# Patient Record
Sex: FEMALE | Race: ASIAN | Employment: OTHER | URBAN - METROPOLITAN AREA
[De-identification: names, ages, dates, MRNs, and addresses within clinical notes are randomized per-mention and may not be internally consistent; named-entity substitution may affect disease eponyms.]

---

## 2017-02-27 ENCOUNTER — TELEPHONE (OUTPATIENT)
Dept: INTERNAL MEDICINE CLINIC | Facility: CLINIC | Age: 77
End: 2017-02-27

## 2017-05-12 PROBLEM — Z85.3 HISTORY OF BREAST CANCER: Status: ACTIVE | Noted: 2017-05-12

## 2017-05-15 ENCOUNTER — OFFICE VISIT (OUTPATIENT)
Dept: FAMILY MEDICINE CLINIC | Facility: CLINIC | Age: 77
End: 2017-05-15

## 2017-05-15 ENCOUNTER — APPOINTMENT (OUTPATIENT)
Dept: LAB | Age: 77
End: 2017-05-15
Attending: FAMILY MEDICINE
Payer: MEDICARE

## 2017-05-15 VITALS
HEART RATE: 59 BPM | TEMPERATURE: 98 F | BODY MASS INDEX: 26.41 KG/M2 | HEIGHT: 59 IN | DIASTOLIC BLOOD PRESSURE: 82 MMHG | WEIGHT: 131 LBS | SYSTOLIC BLOOD PRESSURE: 138 MMHG | RESPIRATION RATE: 16 BRPM

## 2017-05-15 DIAGNOSIS — E05.00 GRAVES DISEASE: ICD-10-CM

## 2017-05-15 DIAGNOSIS — Z00.00 MEDICARE ANNUAL WELLNESS VISIT, SUBSEQUENT: ICD-10-CM

## 2017-05-15 DIAGNOSIS — Z00.00 ENCOUNTER FOR ANNUAL HEALTH EXAMINATION: Primary | ICD-10-CM

## 2017-05-15 DIAGNOSIS — Z85.3 HISTORY OF BREAST CANCER: ICD-10-CM

## 2017-05-15 DIAGNOSIS — E78.2 MIXED HYPERLIPIDEMIA: ICD-10-CM

## 2017-05-15 PROCEDURE — 96160 PT-FOCUSED HLTH RISK ASSMT: CPT | Performed by: FAMILY MEDICINE

## 2017-05-15 NOTE — PROGRESS NOTES
HPI:   Tracey Kinsey is a 76year old female who presents for a Medicare Subsequent Annual Wellness visit (Pt already had Initial Annual Wellness). Patient is here for routine physical exam and medicare annual check up. No acute issues.  Chronic active p (); Hyperlipidemia; Osteoarthritis; Seasonal allergies; and Graves' disease. She  has past surgical history that includes ; Mastectomy modified radical (Right, );  Breast biopsy (Left, ); cataract (Bilateral, ); hc implant prosth have to strain to   understand conversations:  No   I have to worry about missing the telephone ring or doorbell:  No I have   trouble hearing conversations in a noisy background such as a crowded room   or restaurant:  No   I get confused about where soun edema   Pulses: 2+ and symmetric   Skin: Skin color, texture, turgor normal, no rashes or lesions   Lymph nodes: Cervical, supraclavicular, and axillary nodes normal   Neurologic: Normal       SUGGESTED VACCINATIONS - Influenza, Pneumococcal, Zoster, Tetan PLAN:  The patient indicates understanding of these issues and agrees to the plan. No Follow-up on file.      Lauren Chung MD, 5/15/2017     General Health     In the past six months, have you lost more than 10 pounds without trying?: 2 - No    Has y two weeks)?: Not at all    Feeling down, depressed, or hopeless (over the last two weeks)?: Not at all    PHQ-2 SCORE: 0        Advance Directives     Do you have a healthcare power of ?: No    Do you have a living will?: Yes     Please go to [facility] 06/06/2014    No flowsheet data found. Pap and Pelvic      Pap: Every 3 yrs age 21-68 or Pap+HPV every 5 yrs age 33-67, age 72 and older at high risk There are no preventive care reminders to display for this patient.  Update Surf Air if applic Value   07/25/2014 0.62     CREATININE (P) (mg/dL)   Date Value   05/12/2016 0.70    No flowsheet data found. BUN  Annually BUN (mg/dL)   Date Value   05/12/2016 9     BLOOD UREA NITROGEN (mg/dL)   Date Value   07/25/2014 13    No flowsheet data found.

## 2017-05-15 NOTE — PATIENT INSTRUCTIONS
Anna Nicole's SCREENING SCHEDULE   Tests on this list are recommended by your physician but may not be covered, or covered at this frequency, by your insurer. Please check with your insurance carrier before scheduling to verify coverage.    PREVENTATIVE S more than 100 cigarettes in their lifetime   • Anyone with a family history    Colorectal Cancer Screening  Covered up to Age 76     Colonoscopy Screen   Covered every 10 years- more often if abnormal Colonoscopy,3 Years due on 04/27/2012 Update [facility] year    Pneumococcal 13 (Prevnar)  Covered Once after 65 No orders found for this or any previous visit. Please get once after your 65th birthday    Pneumococcal 23 (Pneumovax)  Covered Once after 65 No orders found for this or any previous visit.  Please g

## 2017-06-30 PROCEDURE — 84445 ASSAY OF TSI GLOBULIN: CPT | Performed by: PHYSICIAN ASSISTANT

## 2018-01-09 ENCOUNTER — TELEPHONE (OUTPATIENT)
Dept: INTERNAL MEDICINE CLINIC | Facility: CLINIC | Age: 78
End: 2018-01-09

## 2018-01-31 ENCOUNTER — TELEPHONE (OUTPATIENT)
Dept: INTERNAL MEDICINE CLINIC | Facility: CLINIC | Age: 78
End: 2018-01-31

## 2018-01-31 NOTE — TELEPHONE ENCOUNTER
Patient is eligible for a 2018 Annual Medicare Health Assessment. Discussed w/. Appt scheduled for 5/15/18.

## 2018-05-09 PROBLEM — M47.816 ARTHRITIS OF LUMBAR SPINE: Status: ACTIVE | Noted: 2018-05-09

## 2018-05-15 ENCOUNTER — OFFICE VISIT (OUTPATIENT)
Dept: FAMILY MEDICINE CLINIC | Facility: CLINIC | Age: 78
End: 2018-05-15

## 2018-05-15 VITALS
WEIGHT: 131 LBS | BODY MASS INDEX: 26.41 KG/M2 | SYSTOLIC BLOOD PRESSURE: 136 MMHG | HEIGHT: 59 IN | RESPIRATION RATE: 20 BRPM | TEMPERATURE: 98 F | DIASTOLIC BLOOD PRESSURE: 72 MMHG | HEART RATE: 58 BPM

## 2018-05-15 DIAGNOSIS — Z00.00 ENCOUNTER FOR ANNUAL HEALTH EXAMINATION: Primary | ICD-10-CM

## 2018-05-15 DIAGNOSIS — M25.512 ACUTE PAIN OF LEFT SHOULDER: ICD-10-CM

## 2018-05-15 DIAGNOSIS — E05.00 GRAVES DISEASE: ICD-10-CM

## 2018-05-15 DIAGNOSIS — E78.2 MIXED HYPERLIPIDEMIA: ICD-10-CM

## 2018-05-15 DIAGNOSIS — Z00.00 MEDICARE ANNUAL WELLNESS VISIT, SUBSEQUENT: ICD-10-CM

## 2018-05-15 DIAGNOSIS — M47.816 ARTHRITIS OF LUMBAR SPINE: ICD-10-CM

## 2018-05-15 PROCEDURE — 96160 PT-FOCUSED HLTH RISK ASSMT: CPT | Performed by: FAMILY MEDICINE

## 2018-05-15 PROCEDURE — G0439 PPPS, SUBSEQ VISIT: HCPCS | Performed by: FAMILY MEDICINE

## 2018-05-15 NOTE — PATIENT INSTRUCTIONS
Anna Nicole's SCREENING SCHEDULE   Tests on this list are recommended by your physician but may not be covered, or covered at this frequency, by your insurer. Please check with your insurance carrier before scheduling to verify coverage.    PREVENTATIVE S patients who meet one of the following criteria:   • Men who are 73-68 years old and have smoked more than 100 cigarettes in their lifetime   • Anyone with a family history    Colorectal Cancer Screening  Covered up to Age 76     Colonoscopy Screen   Cover Mammogram regularly   Immunizations      Influenza  Covered Annually No orders found for this or any previous visit. Please get every year    Pneumococcal 13 (Prevnar)  Covered Once after 65 No orders found for this or any previous visit.  Please get once a

## 2018-05-15 NOTE — PROGRESS NOTES
HPI:   Talia Delaney is a [de-identified]year old female who presents for a Medicare Subsequent Annual Wellness visit (Pt already had Initial Annual Wellness). Patient is here for routine physical exam and medicare annual check up. No acute issues.  Chronic active pro lumbar spine ([facility] [address]    Wt Readings from Last 3 Encounters:  05/15/18 : 131 lb (59.4 kg)  07/07/17 : 129 lb (58.5 kg)  05/15/17 : 131 lb (59.4 kg)     Last Cholesterol Labs:     Lab Results  Component Value Date   CHOLEST 282 (H) 05/12/2016   HDL 78 05/12/20 Musculoskeletal: Negative for joint swelling. Neurological: Negative. Psychiatric/Behavioral: Negative.            EXAM:   /72   Pulse 58   Temp 97.6 °F (36.4 °C) (Oral)   Resp 20   Ht 4' 11\" (1.499 m)   Wt 131 lb (59.4 kg)   BMI 26.46 kg/m² Physical Exam   Vitals reviewed. Constitutional: She is oriented to person, place, and time. She appears well-developed and well-nourished.    HENT:   Right Ear: Tympanic membrane and ear canal normal.   Left Ear: Tympanic membrane and ear canal [place] wellness visit, subsequent:  - Exam is unremarkable. Screening tests were discussed, and after discussion, will check lab work as below. Healthy diet, exercise, and weight were discussed.  To call if problems and follow up and further management after testi found for this or any previous visit. No flowsheet data found. Fecal Occult Blood Annually No results found for: FOB No flowsheet data found.     Glaucoma Screening      Ophthalmology Visit Annually: Diabetics, FHx Glaucoma, AA>50, > 65 No flows covered with your pharmacy  prescription benefits                    Template: EEMARCELINO SCHULER MEDICARE ANNUAL ASSESSMENT FEMALE Wilfrid Fraziermyriam [[id number]]

## 2018-05-25 ENCOUNTER — LAB ENCOUNTER (OUTPATIENT)
Dept: LAB | Age: 78
End: 2018-05-25
Attending: FAMILY MEDICINE
Payer: MEDICARE

## 2018-05-25 ENCOUNTER — HOSPITAL ENCOUNTER (OUTPATIENT)
Dept: GENERAL RADIOLOGY | Age: 78
Discharge: HOME | End: 2018-05-25
Attending: FAMILY MEDICINE
Payer: MEDICARE

## 2018-05-25 DIAGNOSIS — E78.2 MIXED HYPERLIPIDEMIA: ICD-10-CM

## 2018-05-25 DIAGNOSIS — E05.00 GRAVES DISEASE: ICD-10-CM

## 2018-05-25 DIAGNOSIS — M25.512 ACUTE PAIN OF LEFT SHOULDER: ICD-10-CM

## 2018-05-25 PROCEDURE — 84439 ASSAY OF FREE THYROXINE: CPT

## 2018-05-25 PROCEDURE — 84445 ASSAY OF TSI GLOBULIN: CPT

## 2018-05-25 PROCEDURE — 80053 COMPREHEN METABOLIC PANEL: CPT

## 2018-05-25 PROCEDURE — 80061 LIPID PANEL: CPT

## 2018-05-25 PROCEDURE — 73030 X-RAY EXAM OF SHOULDER: CPT | Performed by: FAMILY MEDICINE

## 2018-05-25 PROCEDURE — 36415 COLL VENOUS BLD VENIPUNCTURE: CPT

## 2018-05-25 PROCEDURE — 84443 ASSAY THYROID STIM HORMONE: CPT

## 2018-05-25 PROCEDURE — 85027 COMPLETE CBC AUTOMATED: CPT

## 2018-05-30 NOTE — PROGRESS NOTES
I would let her know that her labs are fine off medications . She does not need to see me in clinic. Can have PCP monitor her thyroid labs yearly.

## 2018-06-05 NOTE — PROGRESS NOTES
Telephone Information:  Mobile          [phone]    [facility] regarding Dr. Marquez No result note. Hours and number given.

## 2019-02-05 ENCOUNTER — TELEPHONE (OUTPATIENT)
Dept: CASE MANAGEMENT | Age: 79
End: 2019-02-05

## 2019-02-05 NOTE — TELEPHONE ENCOUNTER
Patient is eligible for a 2019 Annual Medicare Health Assessment. Left message to call back [phone].

## 2019-02-18 ENCOUNTER — PATIENT OUTREACH (OUTPATIENT)
Dept: CASE MANAGEMENT | Age: 79
End: 2019-02-18

## 2019-02-18 NOTE — PROGRESS NOTES
Spouse informed patient is eligible 2019 for Medicare Annual Health Assessment visit, states they are in [place] and requested a call back middle of April.

## 2019-06-05 ENCOUNTER — OFFICE VISIT (OUTPATIENT)
Dept: FAMILY MEDICINE CLINIC | Facility: CLINIC | Age: 79
End: 2019-06-05
Payer: MEDICARE

## 2019-06-05 VITALS
HEIGHT: 59 IN | BODY MASS INDEX: 26.41 KG/M2 | DIASTOLIC BLOOD PRESSURE: 80 MMHG | RESPIRATION RATE: 16 BRPM | SYSTOLIC BLOOD PRESSURE: 128 MMHG | WEIGHT: 131 LBS | TEMPERATURE: 98 F | HEART RATE: 52 BPM

## 2019-06-05 DIAGNOSIS — M47.816 ARTHRITIS OF LUMBAR SPINE: ICD-10-CM

## 2019-06-05 DIAGNOSIS — E78.2 MIXED HYPERLIPIDEMIA: ICD-10-CM

## 2019-06-05 DIAGNOSIS — Z86.39 HISTORY OF GRAVES' DISEASE: ICD-10-CM

## 2019-06-05 DIAGNOSIS — Z00.00 MEDICARE ANNUAL WELLNESS VISIT, SUBSEQUENT: ICD-10-CM

## 2019-06-05 DIAGNOSIS — Z85.3 HISTORY OF BREAST CANCER: ICD-10-CM

## 2019-06-05 DIAGNOSIS — Z00.00 ENCOUNTER FOR ANNUAL HEALTH EXAMINATION: Primary | ICD-10-CM

## 2019-06-05 PROCEDURE — G0439 PPPS, SUBSEQ VISIT: HCPCS | Performed by: FAMILY MEDICINE

## 2019-06-05 PROCEDURE — 99397 PER PM REEVAL EST PAT 65+ YR: CPT | Performed by: FAMILY MEDICINE

## 2019-06-05 PROCEDURE — 96160 PT-FOCUSED HLTH RISK ASSMT: CPT | Performed by: FAMILY MEDICINE

## 2019-06-05 NOTE — PROGRESS NOTES
HPI:   Nevaeh Christianson is a 78year old female who presents for a Medicare Subsequent Annual Wellness visit (Pt already had Initial Annual Wellness). Patient is here for routine physical exam and medicare annual check up. No acute issues.  Chronic active pro PCP - General (Family Practice)    Patient Active Problem List:     Mixed hyperlipidemia     Graves disease     History of breast cancer     Arthritis of lumbar spine    Wt Readings from Last 3 Encounters:  06/05/19 : 131 lb (59.4 kg)  05/15/18 : 131 lb (5 abdominal pain and abdominal distention. Genitourinary: Negative. Negative for breast pain. Neurological: Negative. Negative for dizziness and light-headedness. Hematological: Negative. Psychiatric/Behavioral: Negative.            EXAM:   / Acuity: 20/15   Both Eyes Visual Acuity: Uncorrected Both Eyes Chart Acuity: 20/13   Able To Tolerate Visual Acuity: Yes      Physical Exam   Vitals reviewed. Constitutional: She is oriented to person, place, and time.  She appears well-developed and well management. Arthritis of lumbar spine:  - Stable, continue present management. Medicare annual wellness visit, subsequent:  - Exam is unremarkable. Screening tests were discussed, and after discussion, will check lab work as below.  Healthy diet, exer Update Health Maintenance if applicable    Flex Sigmoidoscopy Screen every 10 years No results found for this or any previous visit. No flowsheet data found. Fecal Occult Blood Annually No results found for: FOB No flowsheet data found.     Glaucoma Scr Medically needed    Zoster   Not covered by Medicare Part B No vaccine history found This may be covered with your pharmacy  prescription benefits                    Template: [facility] [[id number]]

## 2019-06-05 NOTE — PATIENT INSTRUCTIONS
Anna Nicole's SCREENING SCHEDULE   Tests on this list are recommended by your physician but may not be covered, or covered at this frequency, by your insurer. Please check with your insurance carrier before scheduling to verify coverage.    PREVENTATIVE S Limited to patients who meet one of the following criteria:   • Men who are 73-68 years old and have smoked more than 100 cigarettes in their lifetime   • Anyone with a family history    Colorectal Cancer Screening  Covered up to Age 76     Colonoscopy Scr Mammogram regularly   Immunizations      Influenza  Covered Annually No orders found for this or any previous visit. Please get every year    Pneumococcal 13 (Prevnar)  Covered Once after 65 No orders found for this or any previous visit.  Please get once a

## 2019-06-27 ENCOUNTER — APPOINTMENT (OUTPATIENT)
Dept: LAB | Age: 79
End: 2019-06-27
Attending: FAMILY MEDICINE
Payer: MEDICARE

## 2019-06-27 DIAGNOSIS — Z86.39 HISTORY OF GRAVES' DISEASE: ICD-10-CM

## 2019-06-27 DIAGNOSIS — E78.2 MIXED HYPERLIPIDEMIA: ICD-10-CM

## 2019-06-27 LAB
ALBUMIN SERPL-MCNC: 3.6 G/DL (ref 3.4–5)
ALBUMIN/GLOB SERPL: 1 {RATIO} (ref 1–2)
ALP LIVER SERPL-CCNC: 81 U/L (ref 55–142)
ALT SERPL-CCNC: 18 U/L (ref 13–56)
ANION GAP SERPL CALC-SCNC: 7 MMOL/L (ref 0–18)
AST SERPL-CCNC: 18 U/L (ref 15–37)
BILIRUB SERPL-MCNC: 0.4 MG/DL (ref 0.1–2)
BUN BLD-MCNC: 10 MG/DL (ref 7–18)
BUN/CREAT SERPL: 12.3 (ref 10–20)
CALCIUM BLD-MCNC: 9 MG/DL (ref 8.5–10.1)
CHLORIDE SERPL-SCNC: 107 MMOL/L (ref 98–112)
CHOLEST SMN-MCNC: 252 MG/DL (ref ?–200)
CO2 SERPL-SCNC: 28 MMOL/L (ref 21–32)
CREAT BLD-MCNC: 0.81 MG/DL (ref 0.55–1.02)
DEPRECATED RDW RBC AUTO: 44.2 FL (ref 35.1–46.3)
ERYTHROCYTE [DISTWIDTH] IN BLOOD BY AUTOMATED COUNT: 12.5 % (ref 11–15)
GLOBULIN PLAS-MCNC: 3.6 G/DL (ref 2.8–4.4)
GLUCOSE BLD-MCNC: 84 MG/DL (ref 70–99)
HCT VFR BLD AUTO: 43 % (ref 35–48)
HDLC SERPL-MCNC: 69 MG/DL (ref 40–59)
HGB BLD-MCNC: 14 G/DL (ref 12–16)
LDLC SERPL CALC-MCNC: 135 MG/DL (ref ?–100)
M PROTEIN MFR SERPL ELPH: 7.2 G/DL (ref 6.4–8.2)
MCH RBC QN AUTO: 31.5 PG (ref 26–34)
MCHC RBC AUTO-ENTMCNC: 32.6 G/DL (ref 31–37)
MCV RBC AUTO: 96.6 FL (ref 80–100)
NONHDLC SERPL-MCNC: 183 MG/DL (ref ?–130)
OSMOLALITY SERPL CALC.SUM OF ELEC: 292 MOSM/KG (ref 275–295)
PATIENT FASTING: YES
PATIENT FASTING: YES
PLATELET # BLD AUTO: 222 10(3)UL (ref 150–450)
POTASSIUM SERPL-SCNC: 4.4 MMOL/L (ref 3.5–5.1)
RBC # BLD AUTO: 4.45 X10(6)UL (ref 3.8–5.3)
SODIUM SERPL-SCNC: 142 MMOL/L (ref 136–145)
TRIGL SERPL-MCNC: 239 MG/DL (ref 30–149)
TSI SER-ACNC: 1.64 MIU/ML (ref 0.36–3.74)
VLDLC SERPL CALC-MCNC: 48 MG/DL (ref 0–30)
WBC # BLD AUTO: 4 X10(3) UL (ref 4–11)

## 2019-06-27 PROCEDURE — 36415 COLL VENOUS BLD VENIPUNCTURE: CPT

## 2019-06-27 PROCEDURE — 85027 COMPLETE CBC AUTOMATED: CPT

## 2019-06-27 PROCEDURE — 84443 ASSAY THYROID STIM HORMONE: CPT

## 2019-06-27 PROCEDURE — 80053 COMPREHEN METABOLIC PANEL: CPT

## 2019-06-27 PROCEDURE — 80061 LIPID PANEL: CPT

## 2020-02-12 ENCOUNTER — TELEPHONE (OUTPATIENT)
Dept: CASE MANAGEMENT | Age: 80
End: 2020-02-12

## 2020-03-13 ENCOUNTER — TELEPHONE (OUTPATIENT)
Dept: CASE MANAGEMENT | Age: 80
End: 2020-03-13

## 2020-03-13 NOTE — TELEPHONE ENCOUNTER
Patient is eligible for a 2020 Medicare Advantage Supervisit. Discussed in detail w/patient. Appt scheduled for 4/27/2020.

## 2020-04-26 ENCOUNTER — MA CHART PREP (OUTPATIENT)
Dept: FAMILY MEDICINE CLINIC | Facility: CLINIC | Age: 80
End: 2020-04-26

## 2020-06-30 ENCOUNTER — MED REC SCAN ONLY (OUTPATIENT)
Dept: FAMILY MEDICINE CLINIC | Facility: CLINIC | Age: 80
End: 2020-06-30

## 2020-07-16 ENCOUNTER — OFFICE VISIT (OUTPATIENT)
Dept: FAMILY MEDICINE CLINIC | Facility: CLINIC | Age: 80
End: 2020-07-16
Payer: MEDICARE

## 2020-07-16 ENCOUNTER — APPOINTMENT (OUTPATIENT)
Dept: LAB | Age: 80
End: 2020-07-16
Attending: FAMILY MEDICINE
Payer: MEDICARE

## 2020-07-16 VITALS
WEIGHT: 139 LBS | RESPIRATION RATE: 16 BRPM | DIASTOLIC BLOOD PRESSURE: 82 MMHG | TEMPERATURE: 98 F | HEIGHT: 59 IN | HEART RATE: 54 BPM | SYSTOLIC BLOOD PRESSURE: 134 MMHG | BODY MASS INDEX: 28.02 KG/M2

## 2020-07-16 DIAGNOSIS — Z86.39 HX OF GRAVES' DISEASE: ICD-10-CM

## 2020-07-16 DIAGNOSIS — E78.2 MIXED HYPERLIPIDEMIA: Primary | ICD-10-CM

## 2020-07-16 DIAGNOSIS — R41.3 MEMORY CHANGE: ICD-10-CM

## 2020-07-16 DIAGNOSIS — E78.2 MIXED HYPERLIPIDEMIA: ICD-10-CM

## 2020-07-16 LAB
ALBUMIN SERPL-MCNC: 3.9 G/DL (ref 3.4–5)
ALBUMIN/GLOB SERPL: 1.1 {RATIO} (ref 1–2)
ALP LIVER SERPL-CCNC: 88 U/L (ref 55–142)
ALT SERPL-CCNC: 23 U/L (ref 13–56)
ANION GAP SERPL CALC-SCNC: 2 MMOL/L (ref 0–18)
AST SERPL-CCNC: 21 U/L (ref 15–37)
BILIRUB SERPL-MCNC: 0.3 MG/DL (ref 0.1–2)
BUN BLD-MCNC: 12 MG/DL (ref 7–18)
BUN/CREAT SERPL: 16 (ref 10–20)
CALCIUM BLD-MCNC: 8.8 MG/DL (ref 8.5–10.1)
CHLORIDE SERPL-SCNC: 108 MMOL/L (ref 98–112)
CHOLEST SMN-MCNC: 253 MG/DL (ref ?–200)
CO2 SERPL-SCNC: 32 MMOL/L (ref 21–32)
CREAT BLD-MCNC: 0.75 MG/DL (ref 0.55–1.02)
DEPRECATED RDW RBC AUTO: 47 FL (ref 35.1–46.3)
ERYTHROCYTE [DISTWIDTH] IN BLOOD BY AUTOMATED COUNT: 12.9 % (ref 11–15)
GLOBULIN PLAS-MCNC: 3.4 G/DL (ref 2.8–4.4)
GLUCOSE BLD-MCNC: 82 MG/DL (ref 70–99)
HCT VFR BLD AUTO: 42.4 % (ref 35–48)
HDLC SERPL-MCNC: 69 MG/DL (ref 40–59)
HGB BLD-MCNC: 13.9 G/DL (ref 12–16)
LDLC SERPL CALC-MCNC: 132 MG/DL (ref ?–100)
M PROTEIN MFR SERPL ELPH: 7.3 G/DL (ref 6.4–8.2)
MCH RBC QN AUTO: 32.3 PG (ref 26–34)
MCHC RBC AUTO-ENTMCNC: 32.8 G/DL (ref 31–37)
MCV RBC AUTO: 98.4 FL (ref 80–100)
NONHDLC SERPL-MCNC: 184 MG/DL (ref ?–130)
OSMOLALITY SERPL CALC.SUM OF ELEC: 293 MOSM/KG (ref 275–295)
PATIENT FASTING Y/N/NP: NO
PATIENT FASTING Y/N/NP: NO
PLATELET # BLD AUTO: 202 10(3)UL (ref 150–450)
POTASSIUM SERPL-SCNC: 4.5 MMOL/L (ref 3.5–5.1)
RBC # BLD AUTO: 4.31 X10(6)UL (ref 3.8–5.3)
SODIUM SERPL-SCNC: 142 MMOL/L (ref 136–145)
TRIGL SERPL-MCNC: 261 MG/DL (ref 30–149)
TSI SER-ACNC: 2.16 MIU/ML (ref 0.36–3.74)
VLDLC SERPL CALC-MCNC: 52 MG/DL (ref 0–30)
WBC # BLD AUTO: 5.3 X10(3) UL (ref 4–11)

## 2020-07-16 PROCEDURE — 84443 ASSAY THYROID STIM HORMONE: CPT

## 2020-07-16 PROCEDURE — 36415 COLL VENOUS BLD VENIPUNCTURE: CPT

## 2020-07-16 PROCEDURE — 80053 COMPREHEN METABOLIC PANEL: CPT

## 2020-07-16 PROCEDURE — 85027 COMPLETE CBC AUTOMATED: CPT

## 2020-07-16 PROCEDURE — 3008F BODY MASS INDEX DOCD: CPT | Performed by: FAMILY MEDICINE

## 2020-07-16 PROCEDURE — 80061 LIPID PANEL: CPT

## 2020-07-16 PROCEDURE — 3075F SYST BP GE 130 - 139MM HG: CPT | Performed by: FAMILY MEDICINE

## 2020-07-16 PROCEDURE — 3079F DIAST BP 80-89 MM HG: CPT | Performed by: FAMILY MEDICINE

## 2020-07-16 PROCEDURE — 99214 OFFICE O/P EST MOD 30 MIN: CPT | Performed by: FAMILY MEDICINE

## 2020-07-16 NOTE — PROGRESS NOTES
HPI:    Patient ID: Timmy Sewell is a 78year old female. Patient is here for follow up for chronic medical issues- hx of grave disease and hyperlipidemia. Pt usually lives in [place] and is visiting now.  Pt stopped her medications for her GRAVES disease thyromegaly present. Cardiovascular: Normal rate, regular rhythm, normal heart sounds and intact distal pulses. Pulmonary/Chest: Effort normal and breath sounds normal.   Abdominal: Soft. Bowel sounds are normal. She exhibits no distension.  There is no

## 2020-07-27 ENCOUNTER — TELEPHONE (OUTPATIENT)
Dept: CASE MANAGEMENT | Age: 80
End: 2020-07-27

## 2020-07-27 NOTE — TELEPHONE ENCOUNTER
Patient is eligible for a 2020 Medicare Advantage Supervisit. Discussed in detail w/patient's . Appt scheduled for 9/1/20.

## 2020-08-26 ENCOUNTER — TELEPHONE (OUTPATIENT)
Dept: NEUROLOGY | Facility: CLINIC | Age: 80
End: 2020-08-26

## 2020-08-26 NOTE — TELEPHONE ENCOUNTER
Tried placing order for Dr. Johny Cox and [facility]. Neither are coming up. Please advise.

## 2020-08-26 NOTE — PROGRESS NOTES
Neurology Initial Visit     Referred By: Dr. Clark ref. provider found    Chief Complaint: Patient presents with:  Memory Loss: New patient referred by Dr Deisy Valdes for memory loss. No memory loss per  patient.        HPI:     Pedro Guerrero is a 78year old female, nightly., Disp: 90 tablet, Rfl: 3      Atorvastatin            NAUSEA AND VOMITING, RASH  Nylon                   RASH  Statins                 NAUSEA AND VOMITING  Zetia [Ezetimibe]       NAUSEA ONLY    ROS:   As in HPI, the rest of the 14 system review w 07/16/2020    WBC 5.3 07/16/2020    .0 07/16/2020      Lab Results   Component Value Date    GLUCOSE 83 07/25/2014    BUN 12 07/16/2020    CA 8.8 07/16/2020    ALT 23 07/16/2020    AST 21 07/16/2020    ALKPHOS 68 05/12/2016    ALB 3.9 07/16/2020

## 2020-08-26 NOTE — TELEPHONE ENCOUNTER
Will call patient to inform of updated referral for the [facility].    Spoke to pt  Ed(PHI verified) to inform, [employer] coverage are refer to the  [facility] and [employer] and  will mailed new referral.     Patient states the have m

## 2020-08-26 NOTE — TELEPHONE ENCOUNTER
MRI BRAIN (CPT=70551)-APPROVED  [facility] [facility] for authorization of approval for above. [facility] states will need to call [facility] to update facility. Use   tracking # [id number] to update facility with [place] [address].    Call transferre

## 2020-09-01 ENCOUNTER — OFFICE VISIT (OUTPATIENT)
Dept: FAMILY MEDICINE CLINIC | Facility: CLINIC | Age: 80
End: 2020-09-01
Payer: MEDICARE

## 2020-09-01 ENCOUNTER — LAB ENCOUNTER (OUTPATIENT)
Dept: LAB | Age: 80
End: 2020-09-01
Attending: Other
Payer: MEDICARE

## 2020-09-01 VITALS
TEMPERATURE: 98 F | WEIGHT: 141 LBS | RESPIRATION RATE: 16 BRPM | HEIGHT: 59 IN | DIASTOLIC BLOOD PRESSURE: 78 MMHG | BODY MASS INDEX: 28.43 KG/M2 | HEART RATE: 60 BPM | SYSTOLIC BLOOD PRESSURE: 174 MMHG

## 2020-09-01 DIAGNOSIS — E05.00 GRAVES DISEASE: ICD-10-CM

## 2020-09-01 DIAGNOSIS — R41.89 COGNITIVE IMPAIRMENT: ICD-10-CM

## 2020-09-01 DIAGNOSIS — F03.90 DEMENTIA WITHOUT BEHAVIORAL DISTURBANCE, UNSPECIFIED DEMENTIA TYPE (HCC): ICD-10-CM

## 2020-09-01 DIAGNOSIS — Z00.00 MEDICARE ANNUAL WELLNESS VISIT, SUBSEQUENT: ICD-10-CM

## 2020-09-01 DIAGNOSIS — E78.2 MIXED HYPERLIPIDEMIA: ICD-10-CM

## 2020-09-01 DIAGNOSIS — Z00.00 ENCOUNTER FOR ANNUAL HEALTH EXAMINATION: Primary | ICD-10-CM

## 2020-09-01 DIAGNOSIS — M47.816 ARTHRITIS OF LUMBAR SPINE: ICD-10-CM

## 2020-09-01 DIAGNOSIS — Z85.3 HISTORY OF BREAST CANCER: ICD-10-CM

## 2020-09-01 LAB
FOLATE SERPL-MCNC: 18.9 NG/ML (ref 8.7–?)
TSI SER-ACNC: 1.01 MIU/ML (ref 0.36–3.74)
VIT B12 SERPL-MCNC: 908 PG/ML (ref 193–986)

## 2020-09-01 PROCEDURE — 82607 VITAMIN B-12: CPT

## 2020-09-01 PROCEDURE — 3077F SYST BP >= 140 MM HG: CPT | Performed by: FAMILY MEDICINE

## 2020-09-01 PROCEDURE — G0439 PPPS, SUBSEQ VISIT: HCPCS | Performed by: FAMILY MEDICINE

## 2020-09-01 PROCEDURE — 3008F BODY MASS INDEX DOCD: CPT | Performed by: FAMILY MEDICINE

## 2020-09-01 PROCEDURE — 86780 TREPONEMA PALLIDUM: CPT

## 2020-09-01 PROCEDURE — 96160 PT-FOCUSED HLTH RISK ASSMT: CPT | Performed by: FAMILY MEDICINE

## 2020-09-01 PROCEDURE — 99397 PER PM REEVAL EST PAT 65+ YR: CPT | Performed by: FAMILY MEDICINE

## 2020-09-01 PROCEDURE — 3078F DIAST BP <80 MM HG: CPT | Performed by: FAMILY MEDICINE

## 2020-09-01 PROCEDURE — 84443 ASSAY THYROID STIM HORMONE: CPT

## 2020-09-01 PROCEDURE — 82746 ASSAY OF FOLIC ACID SERUM: CPT

## 2020-09-01 PROCEDURE — 36415 COLL VENOUS BLD VENIPUNCTURE: CPT

## 2020-09-01 NOTE — PATIENT INSTRUCTIONS
Anna Nicole's SCREENING SCHEDULE   Tests on this list are recommended by your physician but may not be covered, or covered at this frequency, by your insurer. Please check with your insurance carrier before scheduling to verify coverage.    PREVENTATIVE S 100 cigarettes in their lifetime   • Anyone with a family history    Colorectal Cancer Screening  Covered up to Age 76     Colonoscopy Screen   Covered every 10 years- more often if abnormal Colonoscopy due on 09/25/2012 Update [facility] if applic Please get every year    Pneumococcal 13 (Prevnar)  Covered Once after 65 No orders found for this or any previous visit.  Please get once after your 65th birthday    Pneumococcal 23 (Pneumovax)  Covered Once after 65 No orders found for this or any previou

## 2020-09-01 NOTE — PROGRESS NOTES
HPI:   Cristiana Bonds is a 78year old female who presents for a Medicare Subsequent Annual Wellness visit (Pt already had Initial Annual Wellness). Patient is here for routine physical exam and medicare annual check up. No acute issues.  Chronic active pro spine    Wt Readings from Last 3 Encounters:  09/01/20 : 141 lb (64 kg)  08/26/20 : 139 lb (63 kg)  07/16/20 : 139 lb (63 kg)     Last Cholesterol Labs:   Lab Results   Component Value Date    CHOLEST 253 (H) 07/16/2020    HDL 69 (H) 07/16/2020     (36.6 °C) (Tympanic)   Resp 16   Ht 4' 11\" (1.499 m)   Wt 141 lb (64 kg)   BMI 28.48 kg/m²  Estimated body mass index is 28.48 kg/m² as calculated from the following:    Height as of this encounter: 4' 11\" (1.499 m).     Weight as of this encounter: 141 l Normal rate, regular rhythm, normal heart sounds and intact distal pulses. Pulmonary/Chest: Effort normal and breath sounds normal. No respiratory distress. She has no wheezes. Abdominal: Soft. She exhibits no distension.  There is no hepatosplenomegal level?: Appropriate Exercise  How would you describe your daily physical activity?: Moderate  How would you describe your current health state?: Good  How do you maintain positive mental well-being?: Social Interaction      This section provided for quick then as discussed There are no preventive care reminders to display for this patient.  Update Health Maintenance if applicable     Immunizations (Update Immunization Activity if applicable)     Influenza  Covered Annually No vaccine history found Please get

## 2020-09-02 ENCOUNTER — TELEPHONE (OUTPATIENT)
Dept: NEUROLOGY | Facility: CLINIC | Age: 80
End: 2020-09-02

## 2020-09-02 ENCOUNTER — HOSPITAL ENCOUNTER (OUTPATIENT)
Dept: MRI IMAGING | Facility: HOSPITAL | Age: 80
Discharge: HOME | End: 2020-09-02
Attending: Other
Payer: MEDICARE

## 2020-09-02 DIAGNOSIS — R41.89 COGNITIVE IMPAIRMENT: ICD-10-CM

## 2020-09-02 LAB — T PALLIDUM AB SER QL: NEGATIVE

## 2020-09-02 PROCEDURE — 70551 MRI BRAIN STEM W/O DYE: CPT | Performed by: OTHER

## 2020-09-02 NOTE — TELEPHONE ENCOUNTER
S/w pt and informed her lab results have resulted normal per Dr. Dima Barone in previous note. Pt verbalized understanding.

## 2020-09-02 NOTE — TELEPHONE ENCOUNTER
----- Message from Katelyn Lemons MD sent at 9/2/2020  7:23 AM CDT -----  Please let the patient know that results of these particular lab tests so far were normal.    Thank you

## 2020-09-02 NOTE — TELEPHONE ENCOUNTER
----- Message from Elvia Tejada MD sent at 9/2/2020  7:23 AM CDT -----  Please let the patient know that results of these particular lab tests so far were normal.    Thank you

## 2020-09-03 ENCOUNTER — TELEPHONE (OUTPATIENT)
Dept: NEUROLOGY | Facility: CLINIC | Age: 80
End: 2020-09-03

## 2020-09-03 NOTE — TELEPHONE ENCOUNTER
----- Message from Rambo Lomax MD sent at 9/3/2020 12:15 PM CDT -----  Please let patient know that MRI brain didn't show significant abnormalities.

## 2021-03-12 DIAGNOSIS — Z23 NEED FOR VACCINATION: ICD-10-CM

## 2021-04-06 ENCOUNTER — OFFICE VISIT (OUTPATIENT)
Dept: URBAN - METROPOLITAN AREA CLINIC 51 | Facility: CLINIC | Age: 81
End: 2021-04-06
Payer: MEDICARE

## 2021-04-06 DIAGNOSIS — H35.031 HYPERTENSIVE RETINOPATHY, RIGHT EYE: Primary | ICD-10-CM

## 2021-04-06 DIAGNOSIS — H04.123 TEAR FILM INSUFFICIENCY OF BILATERAL LACRIMAL GLANDS: ICD-10-CM

## 2021-04-06 DIAGNOSIS — Z96.1 PRESENCE OF INTRAOCULAR LENS: ICD-10-CM

## 2021-04-06 DIAGNOSIS — H52.4 PRESBYOPIA: ICD-10-CM

## 2021-04-06 PROCEDURE — 92250 FUNDUS PHOTOGRAPHY W/I&R: CPT | Performed by: OPTOMETRIST

## 2021-04-06 PROCEDURE — 92004 COMPRE OPH EXAM NEW PT 1/>: CPT | Performed by: OPTOMETRIST

## 2021-04-06 ASSESSMENT — INTRAOCULAR PRESSURE
OD: 12
OS: 12

## 2021-04-06 ASSESSMENT — VISUAL ACUITY
OD: 20/20
OS: 20/20

## 2021-04-06 ASSESSMENT — KERATOMETRY
OD: 42.97
OS: 43.33

## 2021-04-06 NOTE — IMPRESSION/PLAN
Impression: Tear film insufficiency of bilateral lacrimal glands: H04.123. Plan: Pt needs to start  using ATs at least QID OU.

## 2021-04-06 NOTE — IMPRESSION/PLAN
Impression: Hypertensive retinopathy, right eye: H35.031. Pt is unaware of HTN, she is taking Pravastatin for high cholesterol *BP measured today 200/90 with BP cuff and 190/80 with manual BP cuff Plan: Pt discussion regarding elevated blood pressure today and pt needs to address issue with PCP. Pt understands.  Pt to hand carry notes to PCP

## 2021-06-17 NOTE — TELEPHONE ENCOUNTER
Denied- Refill request for DONEPEZIL HCL 5 MG Oral Tab    Mara Yi discontinued medication on 9/1/20 due to patient not taking.

## (undated) NOTE — MR AVS SNAPSHOT
[facility] [place] [address] [place]  [id number] [phone]               Thank you for choosing us for your health care visit with Chevy Hawkins MD.  We are glad to serve you and happy to provide you with this summary of y **REFERRAL REQUEST**    Your physician has referred you to a specialist.  Your physician or the clinic staff will provide you with the phone number you should call to schedule your appointment.      If you are confident that your benefit plan will not requ Instructions and Information about Your Health     None      Allergies as of May 15, 2017     Zetia [Ezetimibe] Nausea only                Today's Vital Signs     BP Pulse Temp Height Weight BMI    138/82 mmHg 59 97.6 °F (36.4 °C) (Oral) 4' 11\" (1.499 m) walking, light jogging, cycling, swimming, etc.) for a goal of at least 150 minutes per week. Moderation of alcohol consumption Men: limit to <= 2 drinks* per day. Women and lighter weight persons: limit to <= 1 drink* per day.          Your blood pressu Tips for increasing your physical activity – Adults who are physically active are less likely to develop some chronic diseases than adults who are inactive.      HOW TO GET STARTED: HOW TO STAY MOTIVATED:   Start activities slowly and build up over time Do